# Patient Record
(demographics unavailable — no encounter records)

---

## 2024-10-17 NOTE — HISTORY OF PRESENT ILLNESS
[FreeTextEntry1] : "cah-do-juan jose" (uptempo)  39 yo P0 here for eval of ut myomas. Pt has noticed heavier, longer bleeding.  Interested in future fertility. Has not done cryopreservation.  Referred by Dr. Dalia Paulson

## 2024-10-17 NOTE — DISCUSSION/SUMMARY
[FreeTextEntry1] : 41 yo P0 here for eval and management of ut myomas. Pt w/symptoms of HMB. Interested in future fertility. USG w/one midsize myoma. Questions answered.  Plan MRI TEB for results Schedule robotic myomectomy  Letter to Dr. Dalia Paulson

## 2024-10-17 NOTE — RESULTS/DATA
[TextEntry] : Records to be scanned.  USG 6/19/24 ([facility]) Ut w/7.6 cm rt sided IM myoma. Lt sided 1.4 cm SS myoma. LO 4.3 cm. RO not visualized.  HSG w/b/l spill and cavitary filling defect  CBC 6/6/24 - 11.7/36.1

## 2024-10-17 NOTE — PHYSICAL EXAM
[95202] : A chaperone was present during the pelvic exam. [Normal] : normal [FreeTextEntry7] : C/w BMI. Exam limited. [FreeTextEntry6] : Unable to palpate secondary to habitus.

## 2024-11-11 NOTE — DISCUSSION/SUMMARY
[FreeTextEntry1] : 39 yo P0 here for results of MRI to evaluate ut myomas. MRI reviewed w/patient and partner. Spoke about options for intervention, including myomectomy. Questions answered.  Plan Schedule dilia hoskins

## 2025-02-26 NOTE — DISCUSSION/SUMMARY
[FreeTextEntry1] : 40 yo P0 here for preop chat prior to dilia gato. Case reviewed. Questions answered about plan for surgery, PST, acrylic nails, PO care, RTW (2-3 wks), etc.  Plan Keep appt for PST Keep appt for surgery
1 = Total assistance

## 2025-02-26 NOTE — HISTORY OF PRESENT ILLNESS
[Other Location: e.g. School (Enter Location, City,State)___] : at [unfilled], at the time of the visit. [Medical Office: ([facility])___] : at the medical office located in  [Telehealth (audio & video)] : This visit was provided via telehealth using real-time 2-way audio visual technology. [FreeTextEntry1] : 40 yo P0 here for preop chat prior to dilia gato.